# Patient Record
Sex: MALE | Race: WHITE | HISPANIC OR LATINO | Employment: FULL TIME | URBAN - METROPOLITAN AREA
[De-identification: names, ages, dates, MRNs, and addresses within clinical notes are randomized per-mention and may not be internally consistent; named-entity substitution may affect disease eponyms.]

---

## 2020-12-14 ENCOUNTER — OFFICE VISIT (OUTPATIENT)
Dept: URGENT CARE | Facility: PHYSICIAN GROUP | Age: 25
End: 2020-12-14
Payer: COMMERCIAL

## 2020-12-14 VITALS
HEIGHT: 65 IN | RESPIRATION RATE: 16 BRPM | WEIGHT: 190 LBS | TEMPERATURE: 97.7 F | DIASTOLIC BLOOD PRESSURE: 98 MMHG | HEART RATE: 71 BPM | SYSTOLIC BLOOD PRESSURE: 146 MMHG | OXYGEN SATURATION: 97 % | BODY MASS INDEX: 31.65 KG/M2

## 2020-12-14 DIAGNOSIS — R11.0 POSTPRANDIAL NAUSEA: ICD-10-CM

## 2020-12-14 DIAGNOSIS — K25.3 ACUTE GASTRIC ULCER WITHOUT HEMORRHAGE OR PERFORATION: ICD-10-CM

## 2020-12-14 DIAGNOSIS — R19.7 DIARRHEA, UNSPECIFIED TYPE: ICD-10-CM

## 2020-12-14 PROCEDURE — 99203 OFFICE O/P NEW LOW 30 MIN: CPT | Performed by: PHYSICIAN ASSISTANT

## 2020-12-14 RX ORDER — AMOXICILLIN 500 MG/1
TABLET, FILM COATED ORAL
COMMUNITY
Start: 2020-11-21 | End: 2020-12-14

## 2020-12-14 RX ORDER — IBUPROFEN 800 MG/1
800 TABLET ORAL EVERY 6 HOURS PRN
COMMUNITY
Start: 2020-11-21 | End: 2020-12-14

## 2020-12-14 RX ORDER — HYDROCODONE BITARTRATE AND ACETAMINOPHEN 5; 325 MG/1; MG/1
TABLET ORAL
COMMUNITY
Start: 2020-11-21 | End: 2020-12-14

## 2020-12-14 RX ORDER — AMOXICILLIN 875 MG/1
TABLET, COATED ORAL
COMMUNITY
Start: 2020-10-30 | End: 2020-12-14

## 2020-12-14 ASSESSMENT — ENCOUNTER SYMPTOMS
SHORTNESS OF BREATH: 0
FEVER: 0
NAUSEA: 1
MYALGIAS: 0
VOMITING: 0
CONSTIPATION: 0
ABDOMINAL PAIN: 1
DIARRHEA: 1
CHILLS: 0
EYE PAIN: 0
COUGH: 0
SORE THROAT: 0
HEADACHES: 0

## 2020-12-14 NOTE — PATIENT INSTRUCTIONS
· Take omeprazole (generic for prilosec) in the morning 30 minutes before eating  · Take pepcid as directed on bottle  · Take MAALOX before bed  · If you start feeling better keep with it!  If you're getting a lot worse go to the ER (fever, bloody stools, worsening pain), if it's lingering for weeks I put in a referral to a gastroenterologist who can provide further guidance.   · Avoid NSAIDS (ibuprofen, aleve, advil)      Peptic Ulcer    A peptic ulcer is a sore in the lining of the stomach (gastric ulcer) or the first part of the small intestine (duodenal ulcer). The ulcer causes a gradual wearing away (erosion) of the deeper tissue.  What are the causes?  Normally, the lining of the stomach and the small intestine protects them from the acid that digests food. The protective lining can be damaged by:  · An infection caused by a type of bacteria called Helicobacter pylori or H. pylori.  · Regular use of NSAIDs, such as ibuprofen or aspirin.  · Rare tumors in the stomach, small intestine, or pancreas (Zollinger-Patrick syndrome).  What increases the risk?  The following factors may make you more likely to develop this condition:  · Smoking.  · Having a family history of ulcer disease.  · Drinking alcohol.  · Having been hospitalized in an intensive care unit (ICU).  What are the signs or symptoms?  Symptoms of this condition include:  · Persistent burning pain in the area between the chest and the belly button. The pain may be worse on an empty stomach and at night.  · Heartburn.  · Nausea and vomiting.  · Bloating.  If the ulcer results in bleeding, it can cause:  · Black, tarry stools.  · Vomiting of bright red blood.  · Vomiting of material that looks like coffee grounds.  How is this diagnosed?  This condition may be diagnosed based on:  · Your medical history and a physical exam.  · Various tests or procedures, such as:  ? Blood tests, stool tests, or breath tests to check for the H. pylori bacteria.  ? An  X-ray exam (upper gastrointestinal series) of the esophagus, stomach, and small intestine.  ? Upper endoscopy. The health care provider examines the esophagus, stomach, and small intestine using a small flexible tube that has a video camera at the end.  ? Biopsy. A tissue sample is removed to be examined under a microscope.  How is this treated?  Treatment for this condition may include:  · Eliminating the cause of the ulcer, such as smoking or use of NSAIDs, and limiting alcohol and caffeine intake.  · Medicines to reduce the amount of acid in your digestive tract.  · Antibiotic medicines, if the ulcer is caused by an H. pylori infection.  · An upper endoscopy may be used to treat a bleeding ulcer.  · Surgery. This may be needed if the bleeding is severe or if the ulcer created a hole somewhere in the digestive system.  Follow these instructions at home:  · Do not drink alcohol if your health care provider tells you not to drink.  · Do not use any products that contain nicotine or tobacco, such as cigarettes, e-cigarettes, and chewing tobacco. If you need help quitting, ask your health care provider.  · Take over-the-counter and prescription medicines only as told by your health care provider.  ? Do not use over-the-counter medicines in place of prescription medicines unless your health care provider approves.  ? Do not take aspirin, ibuprofen, or other NSAIDs unless your health care provider told you to do so.  · Take over-the-counter and prescription medicines only as told by your health care provider.  · Keep all follow-up visits as told by your health care provider. This is important.  Contact a health care provider if:  · Your symptoms do not improve within 7 days of starting treatment.  · You have ongoing indigestion or heartburn.  Get help right away if:  · You have sudden, sharp, or persistent pain in your abdomen.  · You have bloody or dark black, tarry stools.  · You vomit blood or material that looks  like coffee grounds.  · You become light-headed or you feel faint.  · You become weak.  · You become sweaty or clammy.  Summary  · A peptic ulcer is a sore in the lining of the stomach (gastric ulcer) or the first part of the small intestine (duodenal ulcer). The ulcer causes a gradual wearing away (erosion) of the deeper tissue.  · Do not use any products that contain nicotine or tobacco, such as cigarettes, e-cigarettes, and chewing tobacco. If you need help quitting, ask your health care provider.  · Take over-the-counter and prescription medicines only as told by your health care provider. Do not use over-the-counter medicines in place of prescription medicines unless your health care provider approves.  · Contact your health care provider if you have ongoing indigestion or heartburn.  · Keep all follow-up visits as told by your health care provider. This is important.  This information is not intended to replace advice given to you by your health care provider. Make sure you discuss any questions you have with your health care provider.  Document Released: 12/15/2001 Document Revised: 06/25/2019 Document Reviewed: 06/25/2019  [employer]vier Patient Education © 2020 [employer]

## 2020-12-14 NOTE — PROGRESS NOTES
Subjective:     Andrés Kerr is a 25 y.o. male who presents for Diarrhea (nausea )      This is a 25-year-old male who presents to urgent care complaining of mild epigastric abdominal pain with onset immediately after eating.  He notes this is been going on intermittently for around 7 or 8 days.  He is also had intermittent loose stools, once per day over this time.  He notes this epigastric abdominal pain immediately after eating which then seems to fade as time goes on.  He is a had some associated nausea with this pain but no vomiting.  He has had no bloody stools.  He has had no fevers or chills.  He has no history of abdominal surgery, allergies to medications, IBS or other gastrointestinal pathology.  He has no strong family history of colon cancer.  He reports the pain is the same regardless of what he eats including liquids      Review of Systems   Constitutional: Negative for chills and fever.   HENT: Negative for congestion, ear pain and sore throat.    Eyes: Negative for pain.   Respiratory: Negative for cough and shortness of breath.    Cardiovascular: Negative for chest pain.   Gastrointestinal: Positive for abdominal pain, diarrhea and nausea. Negative for constipation and vomiting.   Genitourinary: Negative for dysuria.   Musculoskeletal: Negative for myalgias.   Skin: Negative for rash.   Neurological: Negative for headaches.        CURRENT MEDICATIONS:  • This patient does not have an active medication from one of the medication groupers.    Allergies:   No Known Allergies    Current Problems: Andrés Kerr does not have a problem list on file.  Past Surgical Hx:  No past surgical history on file.   Past Social Hx:     Past Family Hx:  Andrés Kerr family history is not on file.     (Allergies, Medications, & Tobacco/Substance Use were reconciled by the Medical Assistant and reviewed by myself. The family history is prepopulated)       Objective:     /98 (BP Location:  "Left arm, Patient Position: Sitting, BP Cuff Size: Adult)   Pulse 71   Temp 36.5 °C (97.7 °F) (Temporal)   Resp 16   Ht 1.651 m (5' 5\")   Wt 86.2 kg (190 lb)   SpO2 97%   BMI 31.62 kg/m²     Physical Exam  Vitals signs reviewed.   Constitutional:       Appearance: Normal appearance. He is not ill-appearing or toxic-appearing.   HENT:      Head: Normocephalic and atraumatic.      Right Ear: External ear normal.      Left Ear: External ear normal.      Nose: Nose normal.      Mouth/Throat:      Mouth: Mucous membranes are moist.   Eyes:      Conjunctiva/sclera: Conjunctivae normal.   Cardiovascular:      Rate and Rhythm: Normal rate.   Pulmonary:      Effort: Pulmonary effort is normal.   Abdominal:      Comments: Nontender to light and dull palpation, negative Moscoso's, no rebound or guarding.  Hyperactive bowel sounds   Skin:     General: Skin is warm and dry.      Capillary Refill: Capillary refill takes less than 2 seconds.   Neurological:      Mental Status: He is alert and oriented to person, place, and time.         Assessment/Plan:     Diagnosis and associated orders:     1. Acute gastric ulcer without hemorrhage or perforation  REFERRAL TO GASTROENTEROLOGY   2. Diarrhea, unspecified type  REFERRAL TO GASTROENTEROLOGY   3. Postprandial nausea  REFERRAL TO GASTROENTEROLOGY      Comments/MDM:     • Hx and physical are c/w gastritis vs gastric ulcer so will rec tx with H2 blocker and PPI with maalox QHS.  Pt given ER precautions, and ref to gastro.  Negative murphys and patient's pain description make cholecystitis less likely.  Pancreatitis also unlikely based on risk factors.  Patient's pain is mild and limited to immediately after eating.  Advised strict ER precautions if any worsening pain, bloody stools, persistant n/v.  Pt agreeable with plan. Printed AVS with specific instructions for him.          Differential diagnosis, natural history, supportive care, and indications for immediate follow-up " discussed.    Advised the patient to follow-up with the primary care physician for recheck, reevaluation, and consideration of further management.    Please note that this dictation was created using voice recognition software. I have made reasonable attempt to correct obvious errors, but I expect that there are errors of grammar and possibly content that I did not discover before finalizing the note.    This note was electronically signed by Arthur Agarwal PA-C

## 2024-07-07 ENCOUNTER — OFFICE VISIT (OUTPATIENT)
Dept: URGENT CARE | Facility: PHYSICIAN GROUP | Age: 29
End: 2024-07-07
Payer: COMMERCIAL

## 2024-07-07 VITALS
SYSTOLIC BLOOD PRESSURE: 132 MMHG | TEMPERATURE: 97.9 F | WEIGHT: 203.4 LBS | RESPIRATION RATE: 16 BRPM | DIASTOLIC BLOOD PRESSURE: 74 MMHG | HEART RATE: 99 BPM | OXYGEN SATURATION: 96 % | HEIGHT: 65 IN | BODY MASS INDEX: 33.89 KG/M2

## 2024-07-07 DIAGNOSIS — H00.024 HORDEOLUM INTERNUM OF LEFT UPPER EYELID: ICD-10-CM

## 2024-07-07 PROCEDURE — 3078F DIAST BP <80 MM HG: CPT | Performed by: STUDENT IN AN ORGANIZED HEALTH CARE EDUCATION/TRAINING PROGRAM

## 2024-07-07 PROCEDURE — 3075F SYST BP GE 130 - 139MM HG: CPT | Performed by: STUDENT IN AN ORGANIZED HEALTH CARE EDUCATION/TRAINING PROGRAM

## 2024-07-07 PROCEDURE — 99213 OFFICE O/P EST LOW 20 MIN: CPT | Performed by: STUDENT IN AN ORGANIZED HEALTH CARE EDUCATION/TRAINING PROGRAM

## 2024-07-07 RX ORDER — BICTEGRAVIR SODIUM, EMTRICITABINE, AND TENOFOVIR ALAFENAMIDE FUMARATE 50; 200; 25 MG/1; MG/1; MG/1
1 TABLET ORAL DAILY
COMMUNITY
End: 2024-07-07

## 2024-07-07 RX ORDER — ALPRAZOLAM 0.25 MG/1
TABLET ORAL
COMMUNITY
End: 2024-07-07

## 2024-07-07 RX ORDER — CABOTEGRAVIR AND RILPIVIRINE 600-900/3
KIT INTRAMUSCULAR
COMMUNITY
Start: 2024-06-13

## 2024-07-07 RX ORDER — ERYTHROMYCIN 5 MG/G
1 OINTMENT OPHTHALMIC NIGHTLY PRN
Qty: 3.5 G | Refills: 0 | Status: SHIPPED | OUTPATIENT
Start: 2024-07-07 | End: 2024-07-17